# Patient Record
Sex: FEMALE | Race: WHITE | NOT HISPANIC OR LATINO | Employment: FULL TIME | URBAN - METROPOLITAN AREA
[De-identification: names, ages, dates, MRNs, and addresses within clinical notes are randomized per-mention and may not be internally consistent; named-entity substitution may affect disease eponyms.]

---

## 2017-06-27 ENCOUNTER — OFFICE VISIT (OUTPATIENT)
Dept: INTERNAL MEDICINE | Facility: CLINIC | Age: 27
End: 2017-06-27
Payer: COMMERCIAL

## 2017-06-27 VITALS
WEIGHT: 145.5 LBS | DIASTOLIC BLOOD PRESSURE: 68 MMHG | OXYGEN SATURATION: 99 % | SYSTOLIC BLOOD PRESSURE: 118 MMHG | HEIGHT: 61 IN | BODY MASS INDEX: 27.47 KG/M2 | TEMPERATURE: 98 F | HEART RATE: 76 BPM

## 2017-06-27 DIAGNOSIS — S70.11XS CONTUSION OF RIGHT THIGH, SEQUELA: Primary | ICD-10-CM

## 2017-06-27 DIAGNOSIS — Z00.00 ENCOUNTER FOR HEALTH MAINTENANCE EXAMINATION: ICD-10-CM

## 2017-06-27 DIAGNOSIS — D22.9 NEVUS: ICD-10-CM

## 2017-06-27 PROCEDURE — 99395 PREV VISIT EST AGE 18-39: CPT | Mod: S$GLB,,, | Performed by: INTERNAL MEDICINE

## 2017-06-27 PROCEDURE — 99999 PR PBB SHADOW E&M-EST. PATIENT-LVL IV: CPT | Mod: PBBFAC,,, | Performed by: INTERNAL MEDICINE

## 2017-06-27 NOTE — PROGRESS NOTES
"Subjective:       Patient ID: Radha Mccormick is a 26 y.o. female.    Chief Complaint: Bleeding/Bruising (Rt Thigh)    HPI  25 y/o woman here for urgent visit for bruising on R thigh.    Bruise / contusion - happened in March while playing roller derby, fell and injured R thigh one day, then continued skating and fell on same place the next two days. Developed large bruise on R thigh along with area of swelling, thinks this was hematoma. This gradually resolved over 1-2 months.     She is here today because she noticed that area where the bruise had been was darker briefly last week, not as much as a normal bruise, but darker than surrounding skin. No injury or trauma to area. No pain in hip or knee. Skin change has now resolved.   No significant tenderness or pain, but area feels "like a muscle cramp" sometimes. Slight change in sensation over the area.     Depression - doing much better, not seeing therapist, exercising more regularly and feels that this helps.    Would like referral to Gyn for Pap / WWE  Asks about previously-placed referral to Derm for skin check, would like to make this appt.    Review of Systems   Constitutional: Negative for activity change, fatigue and fever.   HENT: Negative.    Eyes: Negative for visual disturbance.   Respiratory: Negative.  Negative for cough and shortness of breath.    Cardiovascular: Negative.  Negative for chest pain and leg swelling.   Gastrointestinal: Negative.  Negative for abdominal pain.   Genitourinary: Negative for dysuria and flank pain.   Musculoskeletal: Negative.  Negative for arthralgias, back pain, gait problem and joint swelling.   Skin: Positive for color change (as noted). Negative for rash.   Neurological: Negative for dizziness, weakness, numbness and headaches.   Psychiatric/Behavioral: Negative for dysphoric mood (improved). The patient is not hyperactive.          Past medical history, surgical history, and family medical history reviewed and " "updated as appropriate.    Medications and allergies reviewed.     Objective:          Vitals:    06/27/17 1028   BP: 118/68   BP Location: Right arm   Patient Position: Sitting   Pulse: 76   Temp: 97.9 °F (36.6 °C)   TempSrc: Oral   SpO2: 99%   Weight: 66 kg (145 lb 8.1 oz)   Height: 5' 1" (1.549 m)     Body mass index is 27.49 kg/m².  Physical Exam   Constitutional: She is oriented to person, place, and time. She appears well-developed and well-nourished. No distress.   HENT:   Head: Normocephalic and atraumatic.   Mouth/Throat: Oropharynx is clear and moist.   Eyes: Conjunctivae and EOM are normal.   Neck: Neck supple.   Cardiovascular: Normal rate and regular rhythm.    Pulmonary/Chest: Effort normal. No respiratory distress.   Abdominal: Soft.   Musculoskeletal: Normal range of motion. She exhibits no edema or tenderness.   Neurological: She is alert and oriented to person, place, and time. She has normal strength. No cranial nerve deficit or sensory deficit. Gait normal.   Skin: Skin is warm and dry.   Area of very slight diffuse hyperpigmentation at upper lateral right thigh   Psychiatric: She has a normal mood and affect.   Vitals reviewed.      Lab Results   Component Value Date    WBC 10.88 09/09/2016    HGB 14.9 09/09/2016    HCT 43.0 09/09/2016     09/09/2016    ALT 14 09/09/2016    AST 18 09/09/2016     09/09/2016    K 4.0 09/09/2016     09/09/2016    CREATININE 0.7 09/09/2016    BUN 12 09/09/2016    CO2 25 09/09/2016       Assessment:       1. Contusion of right thigh, sequela    2. Encounter for health maintenance examination    3. Nevus        Plan:   Radha was seen today for bleeding/bruising.    Diagnoses and all orders for this visit:    Contusion of right thigh, sequela - no problematic findings noted. Reassured patient.    Encounter for health maintenance examination - referring to Gyn for pap. Return in 1 year with labs  -     Ambulatory Referral to Obstetrics / Gynecology  - "     CBC auto differential; Future  -     Comprehensive metabolic panel; Future  -     Lipid panel; Future    Nevus - she will make appt with Derm today    Continue regular exercise.  Health maintenance reviewed with patient as above    Return in about 1 year (around 6/27/2018) for annual physical.    Dario Mathias MD  Internal Medicine  [facility]  6/27/2017

## 2017-07-11 ENCOUNTER — INITIAL CONSULT (OUTPATIENT)
Dept: DERMATOLOGY | Facility: CLINIC | Age: 27
End: 2017-07-11
Payer: COMMERCIAL

## 2017-07-11 DIAGNOSIS — D22.9 MULTIPLE BENIGN NEVI: Primary | ICD-10-CM

## 2017-07-11 PROCEDURE — 99203 OFFICE O/P NEW LOW 30 MIN: CPT | Mod: S$GLB,,, | Performed by: PATHOLOGY

## 2017-07-11 PROCEDURE — 99999 PR PBB SHADOW E&M-EST. PATIENT-LVL II: CPT | Mod: PBBFAC,,, | Performed by: PATHOLOGY

## 2017-07-11 NOTE — PATIENT INSTRUCTIONS

## 2017-07-13 ENCOUNTER — OFFICE VISIT (OUTPATIENT)
Dept: OBSTETRICS AND GYNECOLOGY | Facility: CLINIC | Age: 27
End: 2017-07-13
Payer: COMMERCIAL

## 2017-07-13 VITALS
BODY MASS INDEX: 27.88 KG/M2 | HEIGHT: 61 IN | SYSTOLIC BLOOD PRESSURE: 114 MMHG | WEIGHT: 147.69 LBS | DIASTOLIC BLOOD PRESSURE: 72 MMHG

## 2017-07-13 DIAGNOSIS — N89.8 VAGINAL DISCHARGE: ICD-10-CM

## 2017-07-13 DIAGNOSIS — Z11.3 SCREENING FOR STDS (SEXUALLY TRANSMITTED DISEASES): ICD-10-CM

## 2017-07-13 DIAGNOSIS — Z01.419 WELL WOMAN EXAM WITH ROUTINE GYNECOLOGICAL EXAM: Primary | ICD-10-CM

## 2017-07-13 LAB
C TRACH DNA SPEC QL NAA+PROBE: NOT DETECTED
N GONORRHOEA DNA SPEC QL NAA+PROBE: NOT DETECTED

## 2017-07-13 PROCEDURE — 99999 PR PBB SHADOW E&M-EST. PATIENT-LVL II: CPT | Mod: PBBFAC,,, | Performed by: NURSE PRACTITIONER

## 2017-07-13 PROCEDURE — 99385 PREV VISIT NEW AGE 18-39: CPT | Mod: S$GLB,,, | Performed by: NURSE PRACTITIONER

## 2017-07-13 PROCEDURE — 88175 CYTOPATH C/V AUTO FLUID REDO: CPT

## 2017-07-13 PROCEDURE — 87591 N.GONORRHOEAE DNA AMP PROB: CPT

## 2017-07-13 NOTE — PROGRESS NOTES
"HISTORY OF PRESENT ILLNESS:    Radha Mccormick is a 26 y.o. female, ., Patient's last menstrual period was 2017 (exact date).,  presents for a routine exam and STD screening.  -Here to establish care, recently moved from [place].   -Not currently sexually active - used to be on OCPs (Sana) which made her sagastume.     Past Medical History:   Diagnosis Date    Abnormal Pap smear of cervix     colposcopy    Depression        Past Surgical History:   Procedure Laterality Date    none         MEDICATIONS AND ALLERGIES:    No current outpatient prescriptions on file.    Review of patient's allergies indicates:  No Known Allergies    Family History   Problem Relation Age of Onset    Hypertension Mother     Hypertension Father     Breast cancer Maternal Grandmother      lung cancer ?    Leukemia Maternal Grandfather     Hypertension Paternal Grandmother     Ovarian cancer Paternal Grandmother     Heart disease Paternal Grandfather     Hypertension Paternal Grandfather     Stroke Paternal Grandfather     Colon cancer Neg Hx        Social History     Social History    Marital status: Single     Spouse name: N/A    Number of children: N/A    Years of education: N/A     Occupational History    Not on file.     Social History Main Topics    Smoking status: Never Smoker    Smokeless tobacco: Never Used    Alcohol use Yes      Comment: 1-2 drinks once/month    Drug use: No    Sexual activity: Yes     Partners: Male     Birth control/ protection: Condom     Other Topics Concern    Not on file     Social History Narrative    Moved here from [place] recently. 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        OB HISTORY: None.    COMPREHENSIVE GYN HISTORY:  PAP History: Reports abnormal Pap: . Treatment: Colposcopy. UNKNOWN DATE LAST PAP "NEG".  Infection History: Reports STDs: HPV. Denies PID.  Benign History: Denies uterine fibroids. Denies ovarian cysts. Denies " "endometriosis. Denies other conditions.  Cancer History: Denies cervical cancer. Denies uterine cancer or hyperplasia. Denies ovarian cancer. Denies vulvar cancer or pre-cancer. Denies vaginal cancer or pre-cancer. Denies breast cancer. Denies colon cancer.  Sexual Activity History: Denies currently being sexually active  Menstrual History: Monthly. Mod then light flow.   Dysmenorrhea History: Reports mild dysmenorrhea.   Contraception: Condoms.    ROS:  GENERAL: No weight changes. No swelling. No fatigue. No fever.  CARDIOVASCULAR: No chest pain. No shortness of breath. No leg cramps.   NEUROLOGICAL: No headaches. No vision changes.  BREASTS: No pain. No lumps. No discharge.  ABDOMEN: No pain. No nausea. No vomiting. No diarrhea. No constipation.  REPRODUCTIVE: No abnormal bleeding.   VULVA: No pain. No lesions. No itching.  VAGINA: No relaxation. No itching. No odor. No discharge. No lesions.  URINARY: No incontinence. No nocturia. No frequency. No dysuria.    /72   Ht 5' 1" (1.549 m)   Wt 67 kg (147 lb 11.3 oz)   LMP 06/16/2017 (Exact Date)   BMI 27.91 kg/m²     PE:  APPEARANCE: Well nourished, well developed, in no acute distress.  AFFECT: WNL, alert and oriented x 3.  SKIN: No acne or hirsutism.  NECK: Neck symmetric, without masses or thyromegaly.  NODES: No inguinal, cervical, axillary or femoral lymph node enlargement.  CHEST: Good respiratory effort.   ABDOMEN: Soft. No tenderness or masses.   BREASTS: Symmetrical, no skin changes, visible lesions, palpable masses or nipple discharge bilaterally.  PELVIC: External female genitalia without lesions.  Female hair distribution. Adequate perineal body, Normal urethral meatus. Vagina moist and well rugated without lesions. MUCOID D/C present.  No significant cystocele or rectocele present. Cervix pink without lesions, discharge or tenderness. Uterus is 4-6 week size, regular, mobile and nontender. Adnexa without masses or tenderness.  EXTREMITIES: No " edema    DIAGNOSIS:  1. Well woman exam with routine gynecological exam    2. Vaginal discharge    3. Screening for STDs (sexually transmitted diseases)        PLAN:    Orders Placed This Encounter    C. trachomatis/N. gonorrhoeae by AMP DNA Cervix    HIV-1 and HIV-2 antibodies    RPR    Hepatitis panel, acute    Liquid-based pap smear, screening       COUNSELING:  The patient was counseled today on:  -STDs and prevention including the Gardasil vaccine (has completed 3/3);  -all contraceptive options and she will call back if decides on which one;  -A.C.S. Pap and pelvic exam guidelines (pap every 3 years);  -to follow up with her PCP for other health maintenance.    FOLLOW-UP with me pending test results and annually.

## 2017-10-26 ENCOUNTER — PATIENT MESSAGE (OUTPATIENT)
Dept: INTERNAL MEDICINE | Facility: CLINIC | Age: 27
End: 2017-10-26

## 2017-10-26 DIAGNOSIS — M25.579 ANKLE PAIN, UNSPECIFIED CHRONICITY, UNSPECIFIED LATERALITY: Primary | ICD-10-CM

## 2017-12-14 ENCOUNTER — OFFICE VISIT (OUTPATIENT)
Dept: SPORTS MEDICINE | Facility: CLINIC | Age: 27
End: 2017-12-14
Payer: COMMERCIAL

## 2017-12-14 ENCOUNTER — HOSPITAL ENCOUNTER (OUTPATIENT)
Dept: RADIOLOGY | Facility: HOSPITAL | Age: 27
Discharge: HOME | End: 2017-12-14
Attending: ORTHOPAEDIC SURGERY
Payer: COMMERCIAL

## 2017-12-14 VITALS
HEART RATE: 67 BPM | BODY MASS INDEX: 27.75 KG/M2 | SYSTOLIC BLOOD PRESSURE: 131 MMHG | HEIGHT: 61 IN | DIASTOLIC BLOOD PRESSURE: 86 MMHG | WEIGHT: 147 LBS

## 2017-12-14 DIAGNOSIS — S86.312A STRAIN OF PERONEAL TENDON OF LEFT FOOT, INITIAL ENCOUNTER: ICD-10-CM

## 2017-12-14 DIAGNOSIS — S86.311A STRAIN OF PERONEAL TENDON OF RIGHT FOOT, INITIAL ENCOUNTER: ICD-10-CM

## 2017-12-14 DIAGNOSIS — M79.662 PAIN IN LEFT LOWER LEG: ICD-10-CM

## 2017-12-14 DIAGNOSIS — M79.661 PAIN IN RIGHT LOWER LEG: ICD-10-CM

## 2017-12-14 DIAGNOSIS — M79.661 PAIN IN RIGHT LOWER LEG: Primary | ICD-10-CM

## 2017-12-14 PROCEDURE — 99204 OFFICE O/P NEW MOD 45 MIN: CPT | Mod: S$GLB,,, | Performed by: ORTHOPAEDIC SURGERY

## 2017-12-14 PROCEDURE — 73590 X-RAY EXAM OF LOWER LEG: CPT | Mod: 50,TC,PO

## 2017-12-14 PROCEDURE — 73590 X-RAY EXAM OF LOWER LEG: CPT | Mod: 26,59,RT, | Performed by: RADIOLOGY

## 2017-12-14 PROCEDURE — 73590 X-RAY EXAM OF LOWER LEG: CPT | Mod: 26,LT,, | Performed by: RADIOLOGY

## 2017-12-14 PROCEDURE — 99999 PR PBB SHADOW E&M-EST. PATIENT-LVL III: CPT | Mod: PBBFAC,,, | Performed by: ORTHOPAEDIC SURGERY

## 2017-12-14 RX ORDER — DICLOFENAC SODIUM 75 MG/1
75 TABLET, DELAYED RELEASE ORAL DAILY
Qty: 60 TABLET | Refills: 1 | Status: SHIPPED | OUTPATIENT
Start: 2017-12-14 | End: 2018-02-12

## 2017-12-14 NOTE — PROGRESS NOTES
CC: Right > Left lateral lower leg pain    27 y.o. Female who presents as a new patient to me. She works as a . On a competitive roller derby team; practices 4-5x week. Skates 3 hours each practice. Complaint today is right > left lateral lower leg pain  x 6 months. Pain began during insidiously practice where she was doing repetitive lateral movements on skates. Denies a fall or direct trauma to either leg.  She took 2 months off from mid August to mid October due to the season break, but once she returned to practice her pain resurfaced rather quickly. Denies swelling. Denies the feeling of peroneal instability.  No tense swelling or superficial peroneal nerve symptoms Pain localizes along distal fibula, just proximal to the lateral malleolus. Dull achy pain. Pain increased with lateral movements and repetitive laps on skates. No pain with daily activities or daily walking.  No improvement with KT taping, cold modalities, rest, bracing, orthotics, and foam rolling. Denies injection or surgical history to the left knee. Here today to discuss diagnosis and treatment options.      Negative for diabetes.     Pain Score: 0-No pain    REVIEW OF SYSTEMS:   Constitution: Negative. Negative for chills, fever and night sweats.    Hematologic/Lymphatic: Negative for bleeding problem. Does not bruise/bleed easily.   Skin: Negative for dry skin, itching and rash.   Musculoskeletal: Negative for falls. Positive for right knee pain and  muscle weakness.     PAST MEDICAL HISTORY:   Past Medical History:   Diagnosis Date    Abnormal Pap smear of cervix 2009    colposcopy    Depression        PAST SURGICAL HISTORY:   Past Surgical History:   Procedure Laterality Date    none         FAMILY HISTORY:   Family History   Problem Relation Age of Onset    Hypertension Mother     Hypertension Father     Breast cancer Maternal Grandmother      lung cancer ?    Leukemia Maternal Grandfather     Hypertension Paternal  "Grandmother     Ovarian cancer Paternal Grandmother     Heart disease Paternal Grandfather     Hypertension Paternal Grandfather     Stroke Paternal Grandfather     Colon cancer Neg Hx        SOCIAL HISTORY:   Social History     Social History    Marital status: Single     Spouse name: N/A    Number of children: N/A    Years of education: N/A     Occupational History    Not on file.     Social History Main Topics    Smoking status: Never Smoker    Smokeless tobacco: Never Used    Alcohol use Yes      Comment: 1-2 drinks once/month    Drug use: No    Sexual activity: Yes     Partners: Male     Birth control/ protection: Condom     Other Topics Concern    Not on file     Social History Narrative    Moved here from [place] recently. 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        MEDICATIONS:     Current Outpatient Prescriptions:     diclofenac (VOLTAREN) 75 MG EC tablet, Take 1 tablet (75 mg total) by mouth once daily. Take with food, Disp: 60 tablet, Rfl: 1    ALLERGIES:   Review of patient's allergies indicates:  No Known Allergies     PHYSICAL EXAMINATION:  /86   Pulse 67   Ht 5' 1" (1.549 m)   Wt 66.7 kg (147 lb)   BMI 27.78 kg/m²   General: Well-developed well-nourished 27 y.o. femalein no acute distress   Cardiovascular: Regular rhythm by palpation of distal pulse, normal color and temperature, no concerning varicosities on symptomatic side   Lungs: No labored breathing or wheezing appreciated   Neuro: Alert and oriented ×3   Psychiatric: well oriented to person, place and time, demonstrates normal mood and affect   Skin: No rashes, lesions or ulcers, normal temperature, turgor, and texture on involved extremity    Ortho/SPM Exam   Examination of the bilateral lower extremities demonstrates neutral standing alignment.  Lower leg compartments are soft and nontender.  No tenderness over the anterior or posterior medial tibial surface.  Intact ankle range " of motion.  No pain on resistance testing.  No pain specifically with resisted 5/5 eversion. Mild tenderness over the peroneal tendons at the level of the lateral malleolus and just proximal - cited as area of typical pain.  No peroneal tendon instability.  No bony specific tenderness.  Negative Tinel over the superficial peroneal nerve distribution.  No tenderness specifically over the Achilles tendon.    IMAGING:    X-rays including bilateral tib-fib films were reviewed personally by me showing: No evidence of stress fracture or periosteal reaction.  No acute or chronic changes of significance.      ASSESSMENT:      ICD-10-CM ICD-9-CM   1. Pain in right lower leg M79.661 729.5   2. Pain in left lower leg M79.662 729.5   3. Strain of peroneal tendon of left foot, initial encounter S86.312A 845.19   4. Strain of peroneal tendon of right foot, initial encounter S86.311A 845.19       PLAN:     The patient presents with a six-month history of activity limiting laterally based pain in both lower legs.  She has failed to respond to appropriate conservative treatment.  Exam findings consistent with some degree of peroneal tendinitis with proximal muscular strain.  Treatment options were discussed.  At this time to guide therapy and potential return to skating, I believe an MRI of both lower legs from mid tibia down through ankle would be helpful to assess for any structural pathology and to provide guidance in terms of additional PT and time off from sport.  Until then the patient will take oral anti-inflammatory medication and rest.  Return to clinic next week after MRI studies      Procedures

## 2018-01-02 ENCOUNTER — TELEPHONE (OUTPATIENT)
Dept: SPORTS MEDICINE | Facility: CLINIC | Age: 28
End: 2018-01-02

## 2018-12-18 ENCOUNTER — OFFICE VISIT (OUTPATIENT)
Dept: DERMATOLOGY | Facility: CLINIC | Age: 28
End: 2018-12-18
Payer: COMMERCIAL

## 2018-12-18 DIAGNOSIS — L65.9 HAIR LOSS DISORDER: Primary | ICD-10-CM

## 2018-12-18 DIAGNOSIS — D22.9 MULTIPLE BENIGN NEVI: ICD-10-CM

## 2018-12-18 PROCEDURE — 99214 OFFICE O/P EST MOD 30 MIN: CPT | Mod: S$GLB,,, | Performed by: PATHOLOGY

## 2018-12-18 PROCEDURE — 99999 PR PBB SHADOW E&M-EST. PATIENT-LVL II: CPT | Mod: PBBFAC,,, | Performed by: PATHOLOGY

## 2018-12-18 NOTE — PROGRESS NOTES
Subjective:       Patient ID:  Radha Mccormick is a 28 y.o. female who presents for   Chief Complaint   Patient presents with    Skin Check     tbse     HPI  Pt with no personal or family h/o skin cancer.  She has not had any moles biopsied in the past.  Here for TBSE.      Also with slow but progressive hair thinning and hair loss to vertex with receding frontal hairline and widening of part.  No prior treatment.  Asymptomatic.    Review of Systems   Constitutional: Negative for fever, chills, weight loss, weight gain, fatigue, night sweats and malaise.   Skin: Positive for activity-related sunscreen use. Negative for itching, rash, daily sunscreen use and recent sunburn.   Hematologic/Lymphatic: Does not bruise/bleed easily.        Objective:    Physical Exam   Constitutional: She appears well-developed and well-nourished. No distress.   Neurological: She is alert and oriented to person, place, and time. She is not disoriented.   Psychiatric: She has a normal mood and affect.   Skin:   Areas Examined (abnormalities noted in diagram):   Scalp / Hair Palpated and Inspected  Head / Face Inspection Performed  Neck Inspection Performed  Chest / Axilla Inspection Performed  Abdomen Inspection Performed  Genitals / Buttocks / Groin Inspection Performed  Back Inspection Performed  RUE Inspected  LUE Inspection Performed  RLE Inspected  LLE Inspection Performed  Nails and Digits Inspection Performed                   Diagram Legend     Erythematous scaling macule/papule c/w actinic keratosis       Vascular papule c/w angioma      Pigmented verrucoid papule/plaque c/w seborrheic keratosis      Yellow umbilicated papule c/w sebaceous hyperplasia      Irregularly shaped tan macule c/w lentigo     1-2 mm smooth white papules consistent with Milia      Movable subcutaneous cyst with punctum c/w epidermal inclusion cyst      Subcutaneous movable cyst c/w pilar cyst      Firm pink to brown papule c/w dermatofibroma       Pedunculated fleshy papule(s) c/w skin tag(s)      Evenly pigmented macule c/w junctional nevus     Mildly variegated pigmented, slightly irregular-bordered macule c/w mildly atypical nevus      Flesh colored to evenly pigmented papule c/w intradermal nevus       Pink pearly papule/plaque c/w basal cell carcinoma      Erythematous hyperkeratotic cursted plaque c/w SCC      Surgical scar with no sign of skin cancer recurrence      Open and closed comedones      Inflammatory papules and pustules      Verrucoid papule consistent consistent with wart     Erythematous eczematous patches and plaques     Dystrophic onycholytic nail with subungual debris c/w onychomycosis     Umbilicated papule    Erythematous-base heme-crusted tan verrucoid plaque consistent with inflamed seborrheic keratosis     Erythematous Silvery Scaling Plaque c/w Psoriasis     See annotation      Assessment / Plan:        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  -     CBC auto differential; Future  -     DHEA-sulfate; Future  -     TSH; Future  -     T4, FREE; Future  -     Testosterone, free; Future  -     FERRITIN; Future  -     IRON AND TIBC; Future    Discussed with patient that there are multiple over the counter hair supplements, few of which have proven efficacy in controlled clinical trials. However, anecdotal reports have indicated a benefit for these vitamins.  Handout reviewing active ingredients, allergies, and proper use were provided for Nutrafol, Viviscal, AG Pro, and biotin. The patient can elect to take at his/her discretion.    Also discussed Rogaine 5% soln or foam.    Multiple benign nevi - Patient with several mildly atypical nevi. Instructed patient to observe lesion(s) for changes and follow up in clinic if changes are noted. Discussed ABCDE's of moles.                 Follow-up in about 1 year (around 12/18/2019), or if symptoms worsen or fail to improve.

## 2018-12-21 ENCOUNTER — TELEPHONE (OUTPATIENT)
Dept: DERMATOLOGY | Facility: CLINIC | Age: 28
End: 2018-12-21

## 2018-12-21 NOTE — TELEPHONE ENCOUNTER
LVM for pt to return call in regard to Lab results. Informed pt provider is gone for the day.    ANNA MARIE

## 2021-12-06 NOTE — LETTER
July 13, 2017      Dario Mathias MD  [address]  [facility] [address]           [facility]  [address]  [facility] [address]  Phone: [phone]          Patient: Radha Mccormick   MR Number: [id number]   YOB: 1990   Date of Visit: 7/13/2017       Dear Dr. Dario Mathias:    Thank you for referring Radha Mccormick to me for evaluation. Attached you will find relevant portions of my assessment and plan of care.    If you have questions, please do not hesitate to call me. I look forward to following Radha Mccormick along with you.    Sincerely,    KAE Leiva, ZAHRAA    Enclosure  CC:  No Recipients    If you would like to receive this communication electronically, please contact [email or web address] or [phone] to request more information on [employer] access.    For providers and/or their staff who would like to refer a patient to [facility], please contact us through our one-stop-shop provider referral line, [facility], at [phone].    If you feel you have received this communication in error or would no longer like to receive these types of communications, please e-mail [email or web address]         
Spine appears normal, range of motion is not limited, no muscle or joint tenderness

## 2024-06-19 NOTE — PROGRESS NOTES
Subjective:       Patient ID:  Radha Mccormick is a 26 y.o. female who presents for   Chief Complaint   Patient presents with    Skin Check     TBSE     HPI  Pt with no personal or family h/o skin cancer.  She has not had any moles biopsied in the past.  Here for TBSE.      Review of Systems   Constitutional: Negative for fever, chills, weight loss, weight gain, fatigue, night sweats and malaise.   Skin: Positive for activity-related sunscreen use and recent sunburn. Negative for daily sunscreen use.   Hematologic/Lymphatic: Does not bruise/bleed easily.        Objective:    Physical Exam   Constitutional: She appears well-developed and well-nourished.   Neurological: She is alert.   Skin:   Areas Examined (abnormalities noted in diagram):   Scalp / Hair Palpated and Inspected  Head / Face Inspection Performed  Neck Inspection Performed  Chest / Axilla Inspection Performed  Abdomen Inspection Performed  Genitals / Buttocks / Groin Inspection Performed  Back Inspection Performed  RUE Inspected  LUE Inspection Performed  RLE Inspected  LLE Inspection Performed  Nails and Digits Inspection Performed                   Diagram Legend     Erythematous scaling macule/papule c/w actinic keratosis       Vascular papule c/w angioma      Pigmented verrucoid papule/plaque c/w seborrheic keratosis      Yellow umbilicated papule c/w sebaceous hyperplasia      Irregularly shaped tan macule c/w lentigo     1-2 mm smooth white papules consistent with Milia      Movable subcutaneous cyst with punctum c/w epidermal inclusion cyst      Subcutaneous movable cyst c/w pilar cyst      Firm pink to brown papule c/w dermatofibroma      Pedunculated fleshy papule(s) c/w skin tag(s)      Evenly pigmented macule c/w junctional nevus     Mildly variegated pigmented, slightly irregular-bordered macule c/w mildly atypical nevus      Flesh colored to evenly pigmented papule c/w intradermal nevus       Pink pearly papule/plaque c/w basal cell  carcinoma      Erythematous hyperkeratotic cursted plaque c/w SCC      Surgical scar with no sign of skin cancer recurrence      Open and closed comedones      Inflammatory papules and pustules      Verrucoid papule consistent consistent with wart     Erythematous eczematous patches and plaques     Dystrophic onycholytic nail with subungual debris c/w onychomycosis     Umbilicated papule    Erythematous-base heme-crusted tan verrucoid plaque consistent with inflamed seborrheic keratosis     Erythematous Silvery Scaling Plaque c/w Psoriasis     See annotation      Assessment / Plan:        Multiple benign nevi - Patient with several benign appearing and rare mildly atypical nevi. Regular pigment network via dermoscopy.  No ugly ducklings.  Instructed patient to observe lesion(s) for changes and follow up in clinic if changes are noted. Discussed ABCDE's of moles and brochure provided.                 Return in about 1 year (around 7/11/2018), or if symptoms worsen or fail to improve.   None